# Patient Record
(demographics unavailable — no encounter records)

---

## 2025-02-12 NOTE — BIRTH HISTORY
[At ___ Weeks Gestation] : at [unfilled] weeks gestation [Normal Vaginal Route] : by normal vaginal route [FreeTextEntry1] : 2455 grams  [FreeTextEntry3] : pregnancy complicated by PTL, maternal hx of pituitary adenoma and hypothyroidism

## 2025-02-12 NOTE — PHYSICAL EXAM
[Chin in Prone Position] : chin in prone position  [Chest up in Prone] : chest up in prone [Up on Forearms Prone] : up on forearms prone [Roll Prone to Supine] : roll prone to supine [Roll Supine to Prone] : rolls supine to prone [Sits With Arm Support] : sits with arm support [Creep] : creeps [Unfisted] : unfisted [Manipulates Fingers] : manipulates fingers [Transfer] : transfers objects [Alert To Sounds] : alert to sounds [Soothes When Picked Up] : soothes when picked up  [Social Smile] : has a social smile [Orients To Voice] : orients to voice [[place]] : coos [Laughs Aloud] : laughs aloud ["Phil Vicente"] : phil chin [Razzing] : razzing [Crawl] : does not crawl [Come to Sit] : does not come to sit [Unilateral Reach/Grasp] : does not unilaterally reach/grasp [Finger Feeding] : does not finger feed [Spoon] : does not use a spoon [Gesture Language] : does not gesture language [Understands "No"] : does not understand "No" [Babbling] : does not babble [de-identified] : sits independently  [Head Lag] : no head lag [Anterior Protective] : normal anterior protective [Lateral Protective] : normal lateral protective [Normal] : brachioradialis, knee, abductor, ankle and clonus tendons were normal bilaterally [de-identified] : no clonus

## 2025-02-12 NOTE — SOCIAL HISTORY
[TextEntry] : lives home with both parents and older brother (Aj - 2 years old) Maternal grandmother helps with care Mom is a teacher - 3rd grade in [place] Dad does construction/remodeling No smoker No access to firearm

## 2025-02-12 NOTE — PLAN
[No delays noted, anticipatory developmental guidance given.] : No delays noted, anticipatory developmental guidance given.  [Adjusted age milestones discussed at length.] : Adjusted age milestones discussed at length. [Adjusted Age growth and feeding parameters discussed at length.] : Adjusted Age growth and feeding parameters discussed at length.  [Safety counseling given regarding major safety issues for children this age.] : Safety counseling given regarding major safety issues for children this age. [Baby proofing discussed, socket plugs, cord and cable safety, tablecloth-removal.] : Baby proofing discussed, socket plugs, cord and cable safety, tablecloth-removal. [All medications should be stored in a child proof container out of reach of the child.] : All medications should be stored in a child proof container out of reach of the child.  [Reading daily was encouraged.] : Reading daily was encouraged.  [Parent was counseled regarding AAP recommendations concerning television watching under the age of two.] : Parent was counseled regarding AAP recommendations concerning television watching under the age of two.  [Avoid choking hazards such as peanuts, hot dogs, un-cut grapes, hot dogs, peanut butter, fruits with skins and balloons.] : Avoid choking hazards such as peanuts, hot dogs, un-cut grapes, hot dogs, peanut butter, fruits with skins and balloons.  [FreeTextEntry3] : continue nursing until 1 year corrected

## 2025-02-12 NOTE — HISTORY OF PRESENT ILLNESS
[Gestational Age: ___] : Gestational Age in Weeks: [unfilled] [Chronological Age: ___] : Chronological Age in Months: [unfilled] [Corrected Age: ___] : Corrected Age: [unfilled] [No Parental Concerns] : no parental concerns [No Feeding Issues] : no feeding issues. [___ ounces/feeding] : ~ARELY benjamin/feeding [___ Times/day] : [unfilled] times/day [Single Grain Baby Cereal] : single grain baby cereal [Baby Food] : baby food [Normal] : normal [de-identified] : on Amoxicillin course  [de-identified] : EHM and breast feed  [de-identified] : 7 pm to 5 am [None] : none

## 2025-02-12 NOTE — REASON FOR VISIT
[Initial Visit] : an initial visit for [Mother] : mother [FreeTextEntry2] : assess for developmental delay secondary to prematurity 33.6 weeks [FreeTextEntry3] : Developmental and behavioral progress is of the utmost importance and involves complex nuance. Monitoring children with developmental and behavioral concerns is essential due to potential lifelong implications of diagnoses.